# Patient Record
Sex: MALE | URBAN - METROPOLITAN AREA
[De-identification: names, ages, dates, MRNs, and addresses within clinical notes are randomized per-mention and may not be internally consistent; named-entity substitution may affect disease eponyms.]

---

## 2023-04-14 ENCOUNTER — TRANSCRIBE ORDERS (OUTPATIENT)
Dept: ADMINISTRATIVE | Facility: HOSPITAL | Age: 73
End: 2023-04-14

## 2023-04-14 DIAGNOSIS — R97.20 ELEVATED PROSTATE SPECIFIC ANTIGEN (PSA): Primary | ICD-10-CM

## 2023-05-31 ENCOUNTER — HOSPITAL ENCOUNTER (OUTPATIENT)
Dept: MRI IMAGING | Facility: HOSPITAL | Age: 73
Discharge: HOME | End: 2023-05-31
Admitting: UROLOGY

## 2023-05-31 DIAGNOSIS — R97.20 ELEVATED PROSTATE SPECIFIC ANTIGEN (PSA): ICD-10-CM

## 2023-05-31 PROCEDURE — 0 GADOBENATE DIMEGLUMINE 529 MG/ML SOLUTION: Performed by: UROLOGY

## 2023-05-31 PROCEDURE — 82565 ASSAY OF CREATININE: CPT

## 2023-05-31 PROCEDURE — 72197 MRI PELVIS W/O & W/DYE: CPT

## 2023-05-31 PROCEDURE — A9577 INJ MULTIHANCE: HCPCS | Performed by: UROLOGY

## 2023-05-31 RX ADMIN — GADOBENATE DIMEGLUMINE 15 ML: 529 INJECTION, SOLUTION INTRAVENOUS at 16:35

## 2023-06-01 LAB — CREAT BLDA-MCNC: 1.1 MG/DL (ref 0.6–1.3)

## 2024-08-28 ENCOUNTER — OFFICE VISIT (OUTPATIENT)
Dept: FAMILY MEDICINE CLINIC | Facility: CLINIC | Age: 74
End: 2024-08-28
Payer: COMMERCIAL

## 2024-08-28 VITALS
BODY MASS INDEX: 23.34 KG/M2 | DIASTOLIC BLOOD PRESSURE: 58 MMHG | HEART RATE: 69 BPM | TEMPERATURE: 98.5 F | WEIGHT: 157.6 LBS | HEIGHT: 69 IN | SYSTOLIC BLOOD PRESSURE: 106 MMHG | OXYGEN SATURATION: 98 %

## 2024-08-28 DIAGNOSIS — R10.9 ABDOMINAL CRAMPING: ICD-10-CM

## 2024-08-28 DIAGNOSIS — L60.8 ONYCHOMADESIS OF TOENAIL: ICD-10-CM

## 2024-08-28 DIAGNOSIS — R63.0 DECREASED APPETITE: ICD-10-CM

## 2024-08-28 DIAGNOSIS — Z85.46 HISTORY OF PROSTATE CANCER: ICD-10-CM

## 2024-08-28 DIAGNOSIS — R19.7 DIARRHEA, UNSPECIFIED TYPE: Primary | ICD-10-CM

## 2024-08-28 PROCEDURE — 99204 OFFICE O/P NEW MOD 45 MIN: CPT

## 2024-08-28 RX ORDER — CICLOPIROX 80 MG/ML
SOLUTION TOPICAL NIGHTLY
Qty: 6 ML | Refills: 1 | Status: SHIPPED | OUTPATIENT
Start: 2024-08-28

## 2024-08-28 NOTE — PROGRESS NOTES
Chief Complaint  Establish Care, Diarrhea (X's 2 weeks), and Nail Problem    Subjective          History of Present Illness    Junior Cardenas 74 y.o. male presents today for a new patient appointment. He is here to establish care and is a new patient to me. I reviewed the PFSH recorded today by my MA/LPN staff.       Junior Cardenas 74 y.o. male who presents for evaluation of diarrhea and reports having 1 stools in the last 24 hours and adominal cramping. Symptoms have been present for 2-1/2 weeks. The condition is aggravated by nothing. He is experiencing diarrhea, cramping, and decreased appetite. Alleviating factors are Imodium with some help, but still symptoms. Patient denies fever, chills, constipation, GI symptoms waking them up, abdominal pain, dyschezia, melena, bright red blood in stool, fatty food intolerance, nausea, hematuria, dysphagia, reflux, vomiting, recent travel, recently taking antibiotics, dysphagia, RUQ abdominal pain, and pain referring to the back. His past medical history is notable for no prior issures.  Patient denies recent travel.      Last colonoscopy was over 10 years ago. No personal or family history of colorectal cancer.     Mr. Cardenas also report thick discoloration of the three middle toenails on the left foot. Symptom onset was 4-5 years ago. Treatment to date: OTC nail fungus treatment with some improvement.      Mr. Cardenas is established with Urology for prostate cancer and benign prostatic hyperplasia.  The patient had a positive biopsy in 2019.  MRI of the prostate was performed on 7/22/2021 that revealed:    IMPRESSION:  1. No focal abnormality suspicious for clinically significant prostate cancer.  2. Benign prostatic hyperplasia.  3. No evidence for metastatic disease in the visualized pelvis.        Review of Systems   Constitutional:  Positive for appetite change (decreased appetite). Negative for chills, fatigue and fever.   HENT:  Negative for congestion, sinus  "pressure and trouble swallowing.    Eyes:  Negative for visual disturbance.   Respiratory:  Negative for cough, chest tightness, shortness of breath and wheezing.    Cardiovascular:  Negative for chest pain, palpitations and leg swelling.   Gastrointestinal:  Positive for diarrhea. Negative for abdominal pain, anal bleeding, blood in stool, constipation, nausea, rectal pain and vomiting.        Abdominal cramping   Genitourinary:  Negative for dysuria, frequency and urgency.   Musculoskeletal:  Negative for gait problem, myalgias and neck pain.   Skin:  Negative for rash.   Neurological:  Negative for dizziness, syncope, weakness and light-headedness.   Psychiatric/Behavioral:  Negative for dysphoric mood. The patient is not nervous/anxious.         Objective   Vital Signs:   /58 (BP Location: Right arm, Patient Position: Sitting, Cuff Size: Adult)   Pulse 69   Temp 98.5 °F (36.9 °C) (Oral)   Ht 175.3 cm (69\")   Wt 71.5 kg (157 lb 9.6 oz)   SpO2 98%   BMI 23.27 kg/m²      BMI is within normal parameters. No other follow-up for BMI required.       Physical Exam  Vitals and nursing note reviewed.   Constitutional:       General: He is not in acute distress.     Appearance: He is well-developed and normal weight. He is not ill-appearing.   HENT:      Head: Normocephalic and atraumatic.   Eyes:      General: No scleral icterus.     Conjunctiva/sclera: Conjunctivae normal.      Pupils: Pupils are equal, round, and reactive to light.   Neck:      Vascular: No carotid bruit.   Cardiovascular:      Rate and Rhythm: Normal rate and regular rhythm.      Heart sounds: Normal heart sounds. No murmur heard.     No gallop.   Pulmonary:      Effort: Pulmonary effort is normal. No respiratory distress.      Breath sounds: Normal breath sounds. No wheezing or rales.   Abdominal:      General: Abdomen is flat. Bowel sounds are normal. There is no distension.      Palpations: Abdomen is soft. Abdomen is not rigid. There is " no hepatomegaly, splenomegaly or mass.      Tenderness: There is no abdominal tenderness. There is no guarding or rebound. Negative signs include Ramos's sign and McBurney's sign.      Comments: No abdominal tenderness with palpation. Abdomen palpates soft. No guarding or rebound tenderness.   Musculoskeletal:         General: No deformity. Normal range of motion.      Cervical back: Normal range of motion and neck supple.   Feet:      Left foot:      Skin integrity: No ulcer, blister, skin breakdown, erythema or warmth.      Toenail Condition: Left toenails are abnormally thick. Fungal disease present.     Comments: Thickness of the left three toenails of the left foot with yellow discoloration.  Skin:     General: Skin is warm and dry.      Findings: No rash.   Neurological:      Mental Status: He is alert and oriented to person, place, and time.      Sensory: Sensation is intact.      Gait: Gait normal.   Psychiatric:         Mood and Affect: Mood normal.                         Assessment and Plan      Assessment & Plan  Diarrhea, unspecified type  Increase fluid intake, stay hydrated  Drink water to Gatorade drinks per day  OTC Imodium as needed  Food allergy panel and labs ordered  Referral to Gastroenterology for further evaluation  Go to the ED for any worsening symptoms-warning signs were reviewed with the patient today  Abdominal cramping    Decreased appetite  Work on a healthy diet  Avoid high-fiber foods until diarrhea resolves-info given in MyChart  Return if no improvement  Onychomadesis of toenail  [place] nail lacquer as directed  Cleanse the affected toenails with alcohol once per week  Keep feet clean and dry  Do not go barefoot  Return if no improvement  History of prostate cancer  Established with Urology, asymptomatic  PSA diagnostic lab ordered  Follow-up with Urology    Orders Placed This Encounter   Procedures    Comprehensive metabolic panel    Lipid panel    TSH    PSA DIAGNOSTIC    Food  Allergy Profile    Ambulatory Referral to Gastroenterology    CBC and Differential     New Medications Ordered This Visit   Medications    ciclopirox (PENLAC) 8 % solution     Sig: Apply  topically to the appropriate area as directed Every Night.     Dispense:  6 mL     Refill:  1              Follow Up     Return if symptoms worsen or fail to improve.    Patient was given instructions and counseling regarding his condition or for health maintenance advice. Please see specific information pulled into the AVS if appropriate.

## 2024-11-01 ENCOUNTER — PREP FOR SURGERY (OUTPATIENT)
Dept: SURGERY | Facility: SURGERY CENTER | Age: 74
End: 2024-11-01
Payer: COMMERCIAL

## 2024-11-01 ENCOUNTER — OFFICE VISIT (OUTPATIENT)
Dept: GASTROENTEROLOGY | Facility: CLINIC | Age: 74
End: 2024-11-01
Payer: COMMERCIAL

## 2024-11-01 VITALS
DIASTOLIC BLOOD PRESSURE: 76 MMHG | SYSTOLIC BLOOD PRESSURE: 120 MMHG | HEART RATE: 68 BPM | OXYGEN SATURATION: 95 % | HEIGHT: 69 IN | BODY MASS INDEX: 24.13 KG/M2 | WEIGHT: 162.9 LBS | TEMPERATURE: 96.8 F

## 2024-11-01 DIAGNOSIS — R19.7 DIARRHEA, UNSPECIFIED TYPE: Primary | ICD-10-CM

## 2024-11-01 DIAGNOSIS — Z12.11 ENCOUNTER FOR SCREENING FOR MALIGNANT NEOPLASM OF COLON: ICD-10-CM

## 2024-11-01 DIAGNOSIS — Z12.11 ENCOUNTER FOR SCREENING FOR MALIGNANT NEOPLASM OF COLON: Primary | ICD-10-CM

## 2024-11-01 RX ORDER — SODIUM CHLORIDE 0.9 % (FLUSH) 0.9 %
10 SYRINGE (ML) INJECTION AS NEEDED
OUTPATIENT
Start: 2024-11-01

## 2024-11-01 RX ORDER — SODIUM CHLORIDE, SODIUM LACTATE, POTASSIUM CHLORIDE, CALCIUM CHLORIDE 600; 310; 30; 20 MG/100ML; MG/100ML; MG/100ML; MG/100ML
30 INJECTION, SOLUTION INTRAVENOUS CONTINUOUS PRN
OUTPATIENT
Start: 2024-11-01 | End: 2024-11-01

## 2024-11-01 RX ORDER — SODIUM CHLORIDE 0.9 % (FLUSH) 0.9 %
3 SYRINGE (ML) INJECTION EVERY 12 HOURS SCHEDULED
OUTPATIENT
Start: 2024-11-01

## 2024-11-01 NOTE — PROGRESS NOTES
"Chief Complaint   Patient presents with    Diarrhea         History of Present Illness  Patient is a 74-year-old male who presents today for Evaluation, referred for diarrhea, abdominal cramping, and decreased appetite.    Patient presents today for follow-up.  He had been experiencing diarrhea in August 2024.  He found a correlation between having milk and the diarrhea, he eliminated milk and has been using Lactaid and the diarrhea has completely resolved and he feels back to baseline.    He is back to having 1 formed bowel movement per day.  He denies any abdominal pain.  He never had any blood in his stool.  He denies any upper GI complaints.    His last colonoscopy was 10 years ago.  No history of polyps and no family history of colon cancer.     Result Review :       Office Visit with Ana Luisa Trimble APRN (08/28/2024)    Referral to Gastroenterology for Diarrhea, unspecified type; Abdominal cramping; Decreased appetite (08/28/2024)    Vital Signs:   /76   Pulse 68   Temp 96.8 °F (36 °C)   Ht 175.3 cm (69\")   Wt 73.9 kg (162 lb 14.4 oz)   SpO2 95%   BMI 24.06 kg/m²     Body mass index is 24.06 kg/m².     Physical Exam  Vitals reviewed.   Constitutional:       General: He is not in acute distress.     Appearance: He is well-developed.   HENT:      Head: Normocephalic and atraumatic.   Pulmonary:      Effort: Pulmonary effort is normal. No respiratory distress.   Abdominal:      General: Abdomen is flat. Bowel sounds are normal. There is no distension.      Palpations: Abdomen is soft.      Tenderness: There is no abdominal tenderness.   Skin:     General: Skin is dry.      Coloration: Skin is not pale.   Neurological:      Mental Status: He is alert and oriented to person, place, and time.   Psychiatric:         Thought Content: Thought content normal.           Assessment and Plan    Diagnoses and all orders for this visit:    1. Diarrhea, unspecified type (Primary)    2. Encounter for screening for " malignant neoplasm of colon         Discussion  Patient presents today for evaluation with concerns about diarrhea.  This resolved with elimination of milk.  Feel this was due to lactose intolerance which we discussed can develop as we age.  Diarrhea may also have started as an infection and he could have developed lactose intolerance as a result of this.  As symptoms have resolved, no further testing or treatment needed for diarrhea at this time.  He was instructed to call for any new or worsening symptoms.    Patient is due for colonoscopy for colon cancer screening which we will arrange to be completed.          Follow Up   Return if symptoms worsen or fail to improve.    Patient Instructions   For diarrhea, continue to avoid milk products.    Schedule colonoscopy for colon cancer screening.    Call for any new or worsening symptoms.

## 2024-11-01 NOTE — PATIENT INSTRUCTIONS
For diarrhea, continue to avoid milk products.    Schedule colonoscopy for colon cancer screening.    Call for any new or worsening symptoms.

## 2024-12-20 ENCOUNTER — LAB REQUISITION (OUTPATIENT)
Dept: LAB | Facility: HOSPITAL | Age: 74
End: 2024-12-20
Payer: COMMERCIAL

## 2024-12-20 DIAGNOSIS — D12.0 BENIGN NEOPLASM OF CECUM: ICD-10-CM

## 2024-12-20 DIAGNOSIS — Z12.11 ENCOUNTER FOR SCREENING FOR MALIGNANT NEOPLASM OF COLON: ICD-10-CM

## 2024-12-20 PROCEDURE — 88305 TISSUE EXAM BY PATHOLOGIST: CPT | Performed by: INTERNAL MEDICINE

## 2024-12-23 LAB
CYTO UR: NORMAL
LAB AP CASE REPORT: NORMAL
LAB AP CLINICAL INFORMATION: NORMAL
PATH REPORT.FINAL DX SPEC: NORMAL
PATH REPORT.GROSS SPEC: NORMAL